# Patient Record
Sex: MALE | Race: WHITE
[De-identification: names, ages, dates, MRNs, and addresses within clinical notes are randomized per-mention and may not be internally consistent; named-entity substitution may affect disease eponyms.]

---

## 2018-11-16 ENCOUNTER — HOSPITAL ENCOUNTER (EMERGENCY)
Dept: HOSPITAL 59 - ER | Age: 73
Discharge: HOME | End: 2018-11-16
Payer: MEDICARE

## 2018-11-16 DIAGNOSIS — J44.9: ICD-10-CM

## 2018-11-16 DIAGNOSIS — F17.210: ICD-10-CM

## 2018-11-16 DIAGNOSIS — I10: ICD-10-CM

## 2018-11-16 DIAGNOSIS — R10.11: ICD-10-CM

## 2018-11-16 DIAGNOSIS — R10.31: Primary | ICD-10-CM

## 2018-11-16 LAB
ALBUMIN SERPL-MCNC: 4.6 G/DL (ref 4–5)
ALBUMIN/GLOB SERPL: 1.3 {RATIO} (ref 1.1–1.8)
ALP SERPL-CCNC: 114 U/L (ref 40–129)
ALT SERPL-CCNC: 17 U/L (ref ?–41)
ANION GAP SERPL CALC-SCNC: 13 MMOL/L (ref 7–16)
APPEARANCE UR: CLEAR
AST SERPL-CCNC: 19 U/L (ref 10–50)
BASOPHILS NFR BLD: 0.5 % (ref 0–6)
BILIRUB SERPL-MCNC: 0.5 MG/DL (ref 0.2–1)
BILIRUB UR-MCNC: NEGATIVE MG/DL
BUN SERPL-MCNC: 27 MG/DL (ref 8–23)
CO2 SERPL-SCNC: 23 MMOL/L (ref 22–29)
COLOR UR: YELLOW
CREAT SERPL-MCNC: 1.7 MG/DL (ref 0.7–1.2)
EOSINOPHIL NFR BLD: 8.1 % (ref 0–6)
ERYTHROCYTE [DISTWIDTH] IN BLOOD BY AUTOMATED COUNT: 12.9 % (ref 11.5–14.5)
EST GLOMERULAR FILTRATION RATE: 42 ML/MIN
GLOBULIN SER-MCNC: 3.6 GM/DL (ref 1.4–4.8)
GLUCOSE SERPL-MCNC: 110 MG/DL (ref 74–109)
GLUCOSE UR STRIP-MCNC: NEGATIVE MG/DL
GRANULOCYTES NFR BLD: 76.3 % (ref 47–80)
HCT VFR BLD CALC: 47.5 % (ref 42–52)
HGB BLD-MCNC: 16.1 GM/DL (ref 14–18)
KETONES UR QL STRIP: NEGATIVE
LIPASE SERPL-CCNC: 57 U/L (ref 13–60)
LYMPHOCYTES NFR BLD AUTO: 9.9 % (ref 16–45)
MCH RBC QN AUTO: 29.7 PG (ref 27–33)
MCHC RBC AUTO-ENTMCNC: 33.9 G/DL (ref 32–36)
MCV RBC AUTO: 87.6 FL (ref 81–97)
MONOCYTES NFR BLD: 5.2 % (ref 0–9)
NITRITE UR QL STRIP: NEGATIVE
PLATELET # BLD: 291 K/UL (ref 130–400)
PMV BLD AUTO: 8.7 FL (ref 7.4–10.4)
PROT SERPL-MCNC: 8.2 G/DL (ref 6.6–8.7)
PROT UR QL STRIP: NEGATIVE
RBC # BLD AUTO: 5.42 M/UL (ref 4.4–5.7)
RBC # UR STRIP: NEGATIVE /UL
URINE LEUKOCYTE ESTERASE: NEGATIVE
UROBILINOGEN UR STRIP-ACNC: 0.2 E.U./DL (ref 0.2–1)
WBC # BLD AUTO: 9.8 K/UL (ref 4.2–12.2)

## 2018-11-16 PROCEDURE — 96365 THER/PROPH/DIAG IV INF INIT: CPT

## 2018-11-16 PROCEDURE — 83690 ASSAY OF LIPASE: CPT

## 2018-11-16 PROCEDURE — 74176 CT ABD & PELVIS W/O CONTRAST: CPT

## 2018-11-16 PROCEDURE — 99284 EMERGENCY DEPT VISIT MOD MDM: CPT

## 2018-11-16 PROCEDURE — 85025 COMPLETE CBC W/AUTO DIFF WBC: CPT

## 2018-11-16 PROCEDURE — 81003 URINALYSIS AUTO W/O SCOPE: CPT

## 2018-11-16 PROCEDURE — 80053 COMPREHEN METABOLIC PANEL: CPT

## 2018-11-16 NOTE — EMERGENCY DEPARTMENT RECORD
History of Present Illness





- General


Chief Complaint: Abdominal Pain


Stated Complaint: ABD PAIN


Time Seen by Provider: 18 12:21


Source: Patient, Family


Mode of Arrival: Ambulatory


Limitations: Other (Dementia)





- History of Present Illness


Initial Comments: 





72 yo male presents with right sided abdominal pain for about 4 days.  No 

trauma.  No fevers.  No vomiting or diarrhea.  No changes in appetite.  No 

rash.  No changes in urination.  The pain stays on the right side without 

radiation elsewhere.  He has had his appendix removed in the past.  No back 

pain.  The area hurts to push on it or move.  [facility] or Dr Abdirahman ELDRIDGE Complaint: Abdominal pain


Onset/Timin


-: Days(s)


Location: RUQ, RLQ


Radiation: RUQ, RLQ


Migration to: RUQ, RLQ


Severity: Severe


Severity scale (1-10): 9


Quality: Sharp


Consistency: Intermittent


Improves With: Rest


Worsens With: Movement


Associated Symptoms: Denies other symptoms





- Related Data


 Home Medications











 Medication  Instructions  Recorded  Confirmed  Last Taken


 


Albuterol Sulfate [Proair Hfa] 1 - 2 puff IH .EVERY 4-6 HOURS PRN 18 Unknown


 


Budesonide/Formoterol Fumarate 10.2 gm IH BID 18 Unknown





[Symbicort 80-4.5 Mcg Inhaler]    


 


Citalopram Hydrobromide [Celexa] 40 mg PO DAILY 18 Unknown


 


Cyclobenzaprine HCl [Flexeril] 10 mg PO BID 18 Unknown


 


Divalproex Sodium [Divalproex 250 mg PO DAILY 18 Unknown





Sodium ER]    


 


Donepezil HCl 10 mg PO QHS 18 Unknown


 


Lisinopril 40 mg PO DAILY 18 Unknown


 


Omeprazole [Prilosec] 20 mg PO DAILY 18 Unknown


 


Tiotropium Bromide [Spiriva] 18 mcg IH DAILY 18 Unknown











 Allergies











Allergy/AdvReac Type Severity Reaction Status Date / Time


 


No Known Drug Allergies Allergy   Verified 18 12:18














Travel Screening





- Travel/Exposure Within Last 30 Days


Have you traveled within the last 30 days?: No





Review of Systems


Constitutional: Denies: Chills, Fever, Malaise, Weakness


Eyes: Denies: Eye discharge


ENT: Denies: Congestion, Throat pain


Respiratory: Denies: Cough, Dyspnea


Cardiovascular: Denies: Chest pain, Palpitations, Syncope


Endocrine: Denies: Fatigue


Gastrointestinal: Reports: As per HPI, Abdominal pain.  Denies: Diarrhea, Nausea

, Vomiting


Genitourinary: Denies: Dysuria, Frequency, Hematuria


Musculoskeletal: Denies: Arthralgia, Back pain, Joint swelling, Myalgia


Skin: Denies: Bruising, Change in color, Rash


Neurological: Denies: Headache, Numbness, Weakness


Psychiatric: Denies: Anxiety


Hematological/Lymphatic: Denies: Blood Clots, Easy bleeding, Easy bruising





Past Medical History





- SOCIAL HISTORY


Smoking Status: Light tobacco smoker (<10/day)


Alcohol Use: None


Drug Use: None





- RESPIRATORY


Hx Respiratory Disorders: Yes


Hx Asthma: Yes


Hx COPD: Yes





- CARDIOVASCULAR


Hx Cardio Disorders: Yes


Hx Hypertension: Yes





- NEURO


Hx Neuro Disorders: No





- GI


Hx GI Disorders: Yes


Hx Reflux: Yes





- 


Hx Genitourinary Disorders: No





- ENDOCRINE


Hx Endocrine Disorders: No





- MUSCULOSKELETAL


Hx Musculoskeletal Disorders: Yes


Hx Arthritis: Yes





- PSYCH


Hx Psych Problems: Yes


Hx Anxiety: Yes


Hx Depression: Yes


Comment:: alzheimers





- HEMATOLOGY/ONCOLOGY


Hx Hematology/Oncology Disorders: No





Family Medical History


Any Significant Family History?: No





Physical Exam





- General


General Appearance: Alert, Cooperative, No acute distress, Other (Memory 

impairment)


Limitations: No limitations





- Head


Head exam: Atraumatic, Normal inspection





- Eye


Eye exam: Normal appearance, PERRL.  negative: Conjunctival injection, Scleral 

icterus





- ENT


ENT exam: Normal exam


Ear exam: Normal external inspection


Nasal Exam: Normal inspection


Mouth exam: Normal external inspection





- Neck


Neck exam: Normal inspection





- Respiratory


Respiratory exam: Normal lung sounds bilaterally.  negative: Decreased breath 

sounds, Respiratory distress, Rhonchi, Stridor, Wheezes





- Cardiovascular


Cardiovascular Exam: Regular rate, Normal rhythm, Normal heart sounds


Peripheral Pulses: 2+: Radial (R), Radial (L)





- GI/Abdominal


GI/Abdominal exam: Soft, Tenderness (tender right side from upper to lower, no 

mass, soft, no rash, remainder of the abdomen is very soft and non tender).  

negative: Distended





- Rectal


Rectal exam: Deferred





- 


 exam: Deferred





- Extremities


Extremities exam: Normal inspection, Full ROM, Normal capillary refill.  

negative: Tenderness





- Back


Back exam: Reports: Normal inspection.  Denies: CVA tenderness (R), CVA 

tenderness (L), Rash noted





- Neurological


Neurological exam: Alert





- Psychiatric


Psychiatric exam: Normal affect, Normal mood





- Skin


Skin exam: Dry, Intact, Normal color, Warm





Course





 Vital Signs











  18





  12:11


 


Temperature 98.0 F


 


Pulse Rate 78


 


Respiratory 20





Rate 


 


Blood Pressure 157/83


 


Pulse Ox 99














- Reevaluation(s)


Reevaluation #1: 





18 12:55


No acute changes on the CBC


18 14:58


The CR is 1.7


No old





The CT was changed to non contrast (IV)


The CT was read as sub centimeter right renal mass, liver cysts, post op 

appendix, diverticulosis without diverticulitis.


No acute process.  





Waiting for UA


18 15:27


The UA is negative for acute process.


18 15:27


We discussed the labs with he mild renal insufficiency


We discussed the CT was negative for acute process


We discussed home care, reasons to return, and close follow up for recheck





Medical Decision Making





- Lab Data


Result diagrams: 


 18 12:20





 18 12:20





Disposition


Disposition: Discharge


Clinical Impression: 


Abdominal pain


Qualifiers:


 Abdominal location: unspecified location Qualified Code(s): R10.9 - 

Unspecified abdominal pain





Disposition: Home, Self-Care


Condition: (1) Good


Instructions:  Abdominal Pain (ED)


Additional Instructions: 


Stay well hydrated


Return or be recheck in the next 2-3 days if not improving and sooner if worse


Return immediately if Chandan has a fever, vomits, or uncontrolled pain


He may take Tylenol as directed for pain.


Avoid motrin as this can hurt the kidney function.  He should have his kidney 

test rechecked or compared to prior kidney tests with his doctor this week


Forms:  Patient Portal Access


Time of Disposition: 15:30





Quality





- Quality Measures


Quality Measures: N/A





- Blood Pressure Screening


Does Patient Have Any of the Following: No


Blood Pressure Classification: Pre-Hypertensive BP Reading


Systolic Measurement: 157


Diastolic Measurement: 83


Screening for High Blood Pressure: < Pre-Hypertensive BP, F/U Documented > [

]


Pre-Hypertensive Follow-up Interventions: Referral to alternative/primary care 

provider.

## 2018-11-17 NOTE — CT SCAN REPORT
EXAM:  CT SCAN ABDOMEN/PELVIS WO CONTRAST 



HISTORY:  RIGHT-SIDED ABDOMINAL PAIN FOR THE PAST TWO DAYS, SEVERE TODAY. 
APPENDECTOMY. 



TECHNIQUE:  Axial CT scan of the abdomen and pelvis performed without IV 
contrast at the referring physician's request. Oral contrast was utilized. 



COMPARISON:  None.  



FINDINGS:  Upper images demonstrate some coronary artery calcification. No 
pleural or pericardial effusion evident.



No calcified gallstones are seen within the gallbladder. No intrarenal calculi 
identified on either side. No hydronephrosis or hydroureter seen on either 
side. No ureteral calculus seen on either side and no bladder calculus evident. 



There is some mild enlargement of the prostate measuring about 5.4 cm in 
transverse, by 4 cm in AP diameter. Correlation with serum PSA and physical 
exam suggested. 



There is a small exophytic mass arising from the posterior aspect of the lower 
pole of the right kidney measuring about 8 mm in size with a CT density of 46. 
This is indeterminate and may just be a hyperdense cyst but, if clinically 
warranted, could be further assessed with a follow-up MRI of the kidneys. 



There are numerous low-attenuation masses seen in the liver. These are also 
incompletely evaluated without IV contrast although the largest of these, 
measuring about 2 cm in size, has a noncontrast CT density of 4 and may well 
represent a cyst. This could also be evaluated with follow-up MRI, if not 
previously documented. 



Evaluation of the viscera limited by lack of IV contrast. Given this limitation
, no definite splenic, adrenal, pancreatic, or left renal mass identified. 



Prominent diverticulosis left side of the colon with a few diverticula in the 
right side of the colon as well. However, no diverticulitis evident. Oral 
contrast given has passed throughout the small bowel into the colon with no 
small bowel obstruction evident. The appendix is not identified consistent with 
the surgical history. 



There is an oval, relatively low-density focus in the mid pelvis just superior 
to the urinary bladder. This measures about 2.4 cm in diameter and has a 
central component of fat density of -76 CT units. This may be an old area of 
fat necrosis. No free intraperitoneal air or free intraperitoneal fluid 
identified. Degenerative disc disease, particularly at the L1-2 interspace. 
Slight anterior subluxation of the L4 on L5, which appears to be on the basis 
of prominent facet joint arthropathy at this level. Prominent hypertrophic 
spurring in the lower thoracic spine. Mild lumbar curve to the left. 



IMPRESSION:  

1.  POST-OP APPENDECTOMY.



2.  NO URINARY TRACT CALCULI OR HYDRONEPHROSIS IDENTIFIED.



3.  SMALL INDETERMINATE LOW-ATTENUATION MASS POSTERIORLY LOWER POLE RIGHT KIDNEY
, ONLY ABOUT 8 MM IN SIZE. MULTIPLE LOW-ATTENUATION MASSES IN THE LIVER, THE 
LARGEST ABOUT 2 CM IN SIZE. THESE ARE ALL INDETERMINATE WITHOUT IV CONTRAST, AS 
DESCRIBED ABOVE. 



4.  MILD ENLARGEMENT OF THE PROSTATE. 



5.  DIVERTICULOSIS IN THE COLON BUT NO DIVERTICULITIS EVIDENT.



6.  OVAL 2.4 CM MASS IN THE MID PELVIS CONTAINING SOME FAT DENSITY MAY 
REPRESENT AN OLD AREA OF FAT NECROSIS. 



7.  MULTILEVEL DEGENERATIVE CHANGE IN THE SPINE. 



JOB NUMBER:  [id number]
MTDD